# Patient Record
Sex: MALE | Race: WHITE | NOT HISPANIC OR LATINO | Employment: FULL TIME | URBAN - NONMETROPOLITAN AREA
[De-identification: names, ages, dates, MRNs, and addresses within clinical notes are randomized per-mention and may not be internally consistent; named-entity substitution may affect disease eponyms.]

---
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
## 2021-08-04 ENCOUNTER — IMMUNIZATION (OUTPATIENT)
Dept: FAMILY MEDICINE | Facility: OTHER | Age: 21
End: 2021-08-04
Attending: FAMILY MEDICINE
Payer: COMMERCIAL

## 2021-08-04 PROCEDURE — 91300 PR COVID VAC PFIZER DIL RECON 30 MCG/0.3 ML IM: CPT

## 2021-08-25 ENCOUNTER — IMMUNIZATION (OUTPATIENT)
Dept: FAMILY MEDICINE | Facility: OTHER | Age: 21
End: 2021-08-25
Attending: FAMILY MEDICINE
Payer: COMMERCIAL

## 2021-08-25 PROCEDURE — 91300 PR COVID VAC PFIZER DIL RECON 30 MCG/0.3 ML IM: CPT

## 2021-09-24 ENCOUNTER — OFFICE VISIT (OUTPATIENT)
Dept: FAMILY MEDICINE | Facility: OTHER | Age: 21
End: 2021-09-24
Payer: COMMERCIAL

## 2021-09-24 VITALS
HEIGHT: 68 IN | RESPIRATION RATE: 18 BRPM | OXYGEN SATURATION: 97 % | TEMPERATURE: 98.6 F | BODY MASS INDEX: 33.96 KG/M2 | HEART RATE: 73 BPM | WEIGHT: 224.1 LBS | SYSTOLIC BLOOD PRESSURE: 108 MMHG | DIASTOLIC BLOOD PRESSURE: 64 MMHG

## 2021-09-24 DIAGNOSIS — J06.9 VIRAL URI: ICD-10-CM

## 2021-09-24 DIAGNOSIS — R07.0 THROAT PAIN: Primary | ICD-10-CM

## 2021-09-24 LAB — GROUP A STREP BY PCR: NOT DETECTED

## 2021-09-24 PROCEDURE — C9803 HOPD COVID-19 SPEC COLLECT: HCPCS

## 2021-09-24 PROCEDURE — G0463 HOSPITAL OUTPT CLINIC VISIT: HCPCS

## 2021-09-24 PROCEDURE — U0003 INFECTIOUS AGENT DETECTION BY NUCLEIC ACID (DNA OR RNA); SEVERE ACUTE RESPIRATORY SYNDROME CORONAVIRUS 2 (SARS-COV-2) (CORONAVIRUS DISEASE [COVID-19]), AMPLIFIED PROBE TECHNIQUE, MAKING USE OF HIGH THROUGHPUT TECHNOLOGIES AS DESCRIBED BY CMS-2020-01-R: HCPCS | Mod: ZL | Performed by: PHYSICIAN ASSISTANT

## 2021-09-24 PROCEDURE — 87651 STREP A DNA AMP PROBE: CPT | Mod: ZL | Performed by: PHYSICIAN ASSISTANT

## 2021-09-24 PROCEDURE — 99213 OFFICE O/P EST LOW 20 MIN: CPT | Performed by: PHYSICIAN ASSISTANT

## 2021-09-24 ASSESSMENT — MIFFLIN-ST. JEOR: SCORE: 2006.01

## 2021-09-24 ASSESSMENT — PAIN SCALES - GENERAL: PAINLEVEL: MODERATE PAIN (4)

## 2021-09-24 NOTE — PROGRESS NOTES
ASSESSMENT/PLAN:    I have reviewed the nursing notes.  I have reviewed the findings, diagnosis, plan and need for follow up with the patient.    1. Throat pain  - Group A Streptococcus PCR Throat Swab  - Symptomatic COVID-19 Virus (Coronavirus) by PCR Nose  -Strep is negative.  Consistent with viral sore throat.  Continue symptomatic remedies at this time.  Antibiotics not warranted.  Refer to #2    2. Viral URI  - Vital signs stable. PE consistent with URI. Testing results were as follows: negative strep. COVID test was performed, recommend that patient remain in quarantine until results return, which typically takes 24-72 hours. If COVID testing is negative, symptoms are improving (no need for antipyretics, etc.), that patient can return to normal ADLs, if COVID test returns positive, recommend quarantine for 10-14 days from symptom onset. Recommend: increased fluids, rest, use of humidifier, antitussives (cough medication), alternating tylenol and ibuprofen every 4-6 hours as needed (if able to take these medications), salt water gargles for sore throats or throat lozenges, honey, netti pots/saline rinses for sinus/congestion, as well as other home remedies. If worsening fevers, chills, uncontrollable nausea/vomiting, dehydration,etc., or other worsening signs occur, patient is agreeable to follow up for reevaluation.     Patient is in agreement and understanding of the above treatment plan. All questions and concerns were addressed and answered to patient's satisfaction. AVS reviewed with patient.     If COVID negative, OK to return to school as long as fever free without use of antipyretics x 24 hours.     If COVID positive, out for 10-14 days from symptom onset.     Discussed warning signs/symptoms indicative of need to f/u    Follow up if symptoms persist or worsen or concerns    I explained my diagnostic considerations and recommendations to the patient, who voiced understanding and agreement with the  treatment plan. All questions were answered. We discussed potential side effects of any prescribed or recommended therapies, as well as expectations for response to treatments.    Corinne Matta PA-C  9/24/2021  11:15 AM    HPI:    Sina Castle is a 20 year old male  who presents to [facility] today for concerns of URI, x yesterday    Symptoms:  No fevers or chills.   No sore throat/pharyngitis/tonsillitis.   Yes allergy/URI Symptoms  No Muffled Sounds/Change in Hearing  No Sensation of Fullness in Ear(s)  No Ringing in Ears/Tinnitus  No Balance Changes  No Dizziness  Yes Congestion (head/nasal/chest)  No Cough/Productive Cough  No Post Nasal Drip  No Headache  No Sinus Pain/Pressure  No Myalgias  No Otalgia  Activity Level Changes: No  Appetite/Liquid Intake Changes: No  Changes to Bowel Habits: No  Changes to Bladder Habits: No  Additional Symptoms to Report: No  History of similar symptoms: No  Prior workup: No    Treatments tried: Fluids and Rest    Site of exposure: last week, family  Type of exposure: colds, flu    PCP: MD Jacob    History reviewed. No pertinent past medical history.  Past Surgical History:   Procedure Laterality Date     OTHER SURGICAL HISTORY      [id number],PATENT DUCTUS ARTERIOUS LIGATION     TONSILLECTOMY, ADENOIDECTOMY, COMBINED      No Comments Provided     Social History     Tobacco Use     Smoking status: Passive Smoke Exposure - Never Smoker     Smokeless tobacco: Never Used     Tobacco comment: Quit smoking: parents smoke outside   Substance Use Topics     Alcohol use: No     Alcohol/week: 0.0 standard drinks     Current Outpatient Medications   Medication Sig Dispense Refill     naproxen (NAPROSYN) 250 MG tablet Take 250 mg by mouth (Patient not taking: Reported on 9/24/2021)       Allergies   Allergen Reactions     No Clinical Screening - See Comments Other (See Comments)     Black walnut trees...Per allergy testing     Past medical history, past surgical history, current  "medications and allergies reviewed and accurate to the best of my knowledge.      ROS:  Refer to HPI    /64   Pulse 73   Temp 98.6  F (37  C) (Tympanic)   Resp 18   Ht 1.727 m (5' 8\")   Wt 101.7 kg (224 lb 1.6 oz)   SpO2 97%   BMI 34.07 kg/m      EXAM:  General Appearance: Well appearing 20-year old male, appropriate appearance for age. No acute distress  Ears: Left TM intact, translucent with bony landmarks appreciated, no erythema, no effusion, no bulging, no purulence.  Right TM intact, translucent with bony landmarks appreciated, no erythema, no effusion, no bulging, no purulence.  Left auditory canal clear.  Right auditory canal clear.  Normal external ears, non tender.  Eyes: conjunctivae normal without erythema or irritation, corneas clear, no drainage or crusting, no eyelid swelling, pupils equal   Orophayrnx: moist mucous membranes, posterior pharynx without erythema, tonsils without hypertrophy, no erythema, no exudates or petechiae, no post nasal drip seen, no trismus, voice clear.    Sinuses:  No sinus tenderness upon palpation of the frontal or maxillary sinuses  Nose:  Bilateral nares: no erythema, no edema, no drainage or congestion   Neck: supple without adenopathy  Respiratory: normal chest wall and respirations.  Normal effort.  Clear to auscultation bilaterally, no wheezing, crackles or rhonchi.  No increased work of breathing.  No cough appreciated.  Cardiac: RRR with no murmurs  Dermatological: no rashes noted of exposed skin  Psychological: normal affect, alert, oriented, and pleasant.     Labs:  Strep: negative  COVID in process    Xray:  None     "

## 2021-09-24 NOTE — PATIENT INSTRUCTIONS
"If a strep test was performed:   We will call you with the results of the strep test, in the meantime, information below on viral colds/upper respiratory tract infections were provided.    If the strep test returns \"POSITIVE\" for strep, you will be prescribed an antibiotic, if this is the case, please take the entire course of antibiotic, even if feeling better prior to this. Continue with symptomatic treatment below as needed.     If the strep test returns \"NEGATIVE\" you do not need antibiotics and are to continue with conservative treatment as outlined below. Continue to monitor symptoms.     If a COVID swab was performed:   Please quarantine until results return.     -If \"NEGATIVE\" and symptoms improving (without need for medication) you are able to return to work/activities of daily limit    -If \"POSITIVE\" please quarantine for 10-14 days from symptom onset    Please refer to your AVS for follow up and pain/symptoms management recommendations (I.e.: medications, helpful conservative treatment modalities, appropriate follow up if need to a specialist or family practice, etc.). Please return to urgent care if your symptoms change or worsen.     Discharge instructions:  -If you were prescribed a medication(s), please take this as prescribed/directed  -Monitor your symptoms, if changing/worsening, return to UC/ER or PCP for follow up    Symptomatic treatments recommended.  -Discussed that antibiotics would not help symptoms of viral URI. Education provided on symptoms of secondary bacterial infection such as new fever, chills, rigors, shortness of breath, increased work of breathing, that can occur with viral URI and need for further evaluation, if they occur.   - Ensure you are staying hydrated by drinking plenty of fluids and eating mild foods and advance diet as tolerated  - Honey can be soothing for sore throat (as long as above 12 months of age)  - Warm salt water gurgles can help soothe sore throat  - " Humidifier can help with congestion and help keep mucus membranes such as throat and nose from drying out.  - Sleeping slightly propped up can help with congestion and postnasal drainage that can worsen cough at bedtime.  - As long as you have never been told to take Tylenol and/or Ibuprofen you can use them to manage fever and body aches per package instructions  Make sure you eat when you take ibuprofen to avoid stomach upset.  - OTC cough medications per package instructions to help with cough. Check to see if the cough/cold medication already has acetaminophen (Tylenol) in it. If it does avoid taking additional Tylenol.  - If sudden onset of new fever, worsening symptoms return for further evaluation.  - OTC antihistamine such as Allegra, Zyrtec, Claritin (generic is okay) can help with nasal/sinus congestion and OTC nasal steroid such as Flonase can help decrease sinus inflammation to help with congestion.  - Education provided on symptoms of post-viral bacterial infections including ear infection and pneumonia. This would require re-evaluation for treatment.

## 2021-09-24 NOTE — LETTER
[facility]  [address]  [place] [address]  Phone: [phone]  Fax: [phone]    September 24, 2021        Sina Castle  [address]  [place] [address]          To whom it may concern:    RE: Sina Castle    Patient was seen and treated today at our clinic.    COVID and strep test in process.     If COVID negative, OK to return to school as long as fever free without use of antipyretics x 24 hours.     If COVID positive, out for 10-14 days from symptom onset.     If strep positive, out x 1 day to allow full day on antibiotics. If strep negative (and all other pertinent testing, ie: COVID is negative) OK to return to school.     Please contact me for questions or concerns.      Sincerely,        Corinne Matta PA-C

## 2021-09-24 NOTE — NURSING NOTE
"Chief Complaint   Patient presents with     Throat Problem     Patient is here for a sore throat that started yesterday. Patient would like a strep and covid swab.     Initial /64   Pulse 73   Temp 98.6  F (37  C) (Tympanic)   Resp 18   Ht 1.727 m (5' 8\")   Wt 101.7 kg (224 lb 1.6 oz)   SpO2 97%   BMI 34.07 kg/m   Estimated body mass index is 34.07 kg/m  as calculated from the following:    Height as of this encounter: 1.727 m (5' 8\").    Weight as of this encounter: 101.7 kg (224 lb 1.6 oz).  Medication Reconciliation: complete    Scarlett Shelley LPN  "

## 2021-09-25 LAB — SARS-COV-2 RNA RESP QL NAA+PROBE: NEGATIVE

## 2021-09-26 ENCOUNTER — TELEPHONE (OUTPATIENT)
Dept: PEDIATRICS | Facility: OTHER | Age: 21
End: 2021-09-26

## 2021-09-27 NOTE — TELEPHONE ENCOUNTER
After verification of name and date of birth, patient notified of results per provider. Patient verbalizes understanding and has no further questions or concerns.  Shellie Carranza RN ....................  9/27/2021   12:08 PM

## 2021-12-30 ENCOUNTER — ALLIED HEALTH/NURSE VISIT (OUTPATIENT)
Dept: FAMILY MEDICINE | Facility: OTHER | Age: 21
End: 2021-12-30
Payer: COMMERCIAL

## 2021-12-30 DIAGNOSIS — R05.9 COUGH: Primary | ICD-10-CM

## 2021-12-30 PROCEDURE — U0005 INFEC AGEN DETEC AMPLI PROBE: HCPCS | Mod: ZL

## 2021-12-30 PROCEDURE — C9803 HOPD COVID-19 SPEC COLLECT: HCPCS

## 2022-01-29 ENCOUNTER — HEALTH MAINTENANCE LETTER (OUTPATIENT)
Age: 22
End: 2022-01-29

## 2022-09-17 ENCOUNTER — HEALTH MAINTENANCE LETTER (OUTPATIENT)
Age: 22
End: 2022-09-17

## 2023-05-06 ENCOUNTER — HEALTH MAINTENANCE LETTER (OUTPATIENT)
Age: 23
End: 2023-05-06

## 2023-05-19 ENCOUNTER — OFFICE VISIT (OUTPATIENT)
Dept: FAMILY MEDICINE | Facility: OTHER | Age: 23
End: 2023-05-19
Attending: NURSE PRACTITIONER
Payer: COMMERCIAL

## 2023-05-19 VITALS
HEART RATE: 71 BPM | BODY MASS INDEX: 34.21 KG/M2 | OXYGEN SATURATION: 97 % | RESPIRATION RATE: 20 BRPM | DIASTOLIC BLOOD PRESSURE: 70 MMHG | TEMPERATURE: 98.5 F | HEIGHT: 68 IN | WEIGHT: 225.7 LBS | SYSTOLIC BLOOD PRESSURE: 120 MMHG

## 2023-05-19 DIAGNOSIS — L03.319 CELLULITIS OF PERIUMBILICAL REGION: Primary | ICD-10-CM

## 2023-05-19 PROCEDURE — G0463 HOSPITAL OUTPT CLINIC VISIT: HCPCS

## 2023-05-19 PROCEDURE — 99213 OFFICE O/P EST LOW 20 MIN: CPT | Performed by: NURSE PRACTITIONER

## 2023-05-19 RX ORDER — SULFAMETHOXAZOLE/TRIMETHOPRIM 800-160 MG
1 TABLET ORAL 2 TIMES DAILY
Qty: 20 TABLET | Refills: 0 | Status: SHIPPED | OUTPATIENT
Start: 2023-05-19 | End: 2023-05-29

## 2023-05-19 ASSESSMENT — PAIN SCALES - GENERAL: PAINLEVEL: SEVERE PAIN (6)

## 2023-05-19 NOTE — NURSING NOTE
Chief Complaint   Patient presents with     Derm Problem     Sore in naval x 3-4 days   Patient tx with antibiotic ointment and ibuprofen - but it is not clearing up      Advanced Care Planning on file? no    Medication Review Completed: complete    FOOD SECURITY SCREENING QUESTIONS:    The next two questions are to help us understand your food security.  If you are feeling you need any assistance in this area, we have resources available to support you today.    Hunger Vital Signs:  Within the past 12 months we worried whether our food would run out before we got money to buy more. Never  Within the past 12 months the food we bought just didn't last and we didn't have money to get more. Never    Mara Fleming LPN

## 2023-05-19 NOTE — PROGRESS NOTES
ASSESSMENT/PLAN:     I have reviewed the nursing notes.  I have reviewed the findings, diagnosis, plan and need for follow up with the patient.        1. Cellulitis of periumbilical region    - sulfamethoxazole-trimethoprim (BACTRIM DS) 800-160 MG tablet; Take 1 tablet by mouth 2 times daily for 10 days  Dispense: 20 tablet; Refill: 0    Close monitoring and follow up if worsening - no palpable fluctuance abscess at this time and afebrile.      Continue warm compresses  Wash with soapy water  May use over-the-counter Tylenol or ibuprofen PRN    Discussed warning signs/symptoms indicative of need to f/u  Follow up if symptoms persist or worsen or concerns      I explained my diagnostic considerations and recommendations to the patient, who voiced understanding and agreement with the treatment plan. All questions were answered. We discussed potential side effects of any prescribed or recommended therapies, as well as expectations for response to treatments.    Lilibeth Cueto NP  [facility] AND [facility]      SUBJECTIVE:   Sina Castle is a 22 year old male who presents to clinic today for the following health issues:  Possible skin infection    HPI  States he has a sore in his navel for the past 3 to 4 days and is concerned about an infection.  States the area is swollen.  Denies any drainage or bleeding.  Denies any trauma.  Denies any known insect or tick bites.  No fevers or chills.  No nausea or vomiting.  Appetite at baseline.  Normal bowel movements.    Using warm compresses BID and using antibiotic ointment  Taking Ibuprofen        No past medical history on file.  Past Surgical History:   Procedure Laterality Date     OTHER SURGICAL HISTORY      [id number],PATENT DUCTUS ARTERIOUS LIGATION     TONSILLECTOMY, ADENOIDECTOMY, COMBINED      No Comments Provided     Social History     Tobacco Use     Smoking status: Passive Smoke Exposure - Never Smoker     Smokeless tobacco: Never     Tobacco comments:  "    Quit smoking: parents smoke outside   Vaping Use     Vaping status: Never Used   Substance Use Topics     Alcohol use: No     Alcohol/week: 0.0 standard drinks of alcohol     Current Outpatient Medications   Medication Sig Dispense Refill     naproxen (NAPROSYN) 250 MG tablet Take 250 mg by mouth       Allergies   Allergen Reactions     Seasonal Allergies      [place] trees     No Clinical Screening - See Comments Other (See Comments)     Black walnut trees...Per allergy testing         Past medical history, past surgical history, current medications and allergies reviewed and accurate to the best of my knowledge.        OBJECTIVE:     /70 (BP Location: Left arm, Patient Position: Sitting, Cuff Size: Adult Regular)   Pulse 71   Temp 98.5  F (36.9  C) (Tympanic)   Resp 20   Ht 1.715 m (5' 7.5\")   Wt 102.4 kg (225 lb 11.2 oz)   SpO2 97%   BMI 34.83 kg/m    Body mass index is 34.83 kg/m .     Physical Exam  General Appearance: Well appearing adolescent male, appropriate appearance for age. No acute distress  Respiratory: normal chest wall and respirations.  Normal effort. No cough appreciated.  Abdomen: soft, localized tenderness just under the navel at the 6 o'clock position without palpable fluctuance, no rigidity, no rebound tenderness or guarding, normal bowel sounds present    Musculoskeletal:  Equal movement of bilateral upper extremities.  Equal movement of bilateral lower extremities.  Normal gait.    Dermatological:  Periumbilical area with localized mild erythema of the navel and surrounding pale erythema and warmth measuring approximately 10 to 12 cm of area, no lesions, pustules, vesicles, open area, drainage or bleeding.  Psychological: normal affect, alert, oriented, and pleasant.         "

## 2024-04-22 NOTE — ADDENDUM NOTE
Encounter addended by: Bruce Montano PT on: 1/30/2019 10:27 AM   Actions taken: Sign clinical note, Episode resolved Pt without fever, meningismus or HA. Do not suspect meningitis. More likely MSK pain given new exercise yesterday while working out and woke up with pain today. Mother at bedside agreeable. Plan: NSAIDs, Flexeril.

## 2024-07-13 ENCOUNTER — HEALTH MAINTENANCE LETTER (OUTPATIENT)
Age: 24
End: 2024-07-13

## 2025-07-19 ENCOUNTER — HEALTH MAINTENANCE LETTER (OUTPATIENT)
Age: 25
End: 2025-07-19

## 2025-07-22 ENCOUNTER — TELEPHONE (OUTPATIENT)
Dept: PEDIATRICS | Facility: OTHER | Age: 25
End: 2025-07-22
Payer: COMMERCIAL

## 2025-07-22 NOTE — TELEPHONE ENCOUNTER
Pt states that he needs a letter for employer that he was diagnosed with pericarditis back in 2019.  Please call.    Jamey Marquez on 7/22/2025 at 10:08 AM

## 2025-07-22 NOTE — TELEPHONE ENCOUNTER
Patient stated he had more information and more questions and would like a call back from [place]. Please call.    Candace Flores on 7/22/2025 at 12:20 PM

## 2025-07-22 NOTE — TELEPHONE ENCOUNTER
Was seen in the ER 2/10/2019 for   Final diagnoses:   Atypical chest pain   Myocarditis (H)   RSV bronchiolitis     Admitted to [facility] 2/10/2019  Acute myocarditis    Needs letter for employer.    Norma J. Gosselin, LPN .......  7/22/2025  10:15 AM

## 2025-07-22 NOTE — TELEPHONE ENCOUNTER
Called and spoke to Patient after verifying last name and date of birth. Informed patient that Dr. James is unable to write such a letter as it has been over 6 years since he was seen patient. Recommended patient schedule a visit, establish with a new provider, or contact their cardiologist. Patient verbalized understanding and has no further questions at this time.   Carmela Moran RN on 7/22/2025 at 3:07 PM

## 2025-07-22 NOTE — TELEPHONE ENCOUNTER
I have not seen him in 6 years.    Signed, Lakhwinder James MD, FAAP, FACP  Internal Medicine & Pediatrics

## (undated) RX ORDER — ASPIRIN 81 MG/1
TABLET, CHEWABLE ORAL
Status: DISPENSED
Start: 2019-02-10